# Patient Record
Sex: FEMALE | Race: WHITE | HISPANIC OR LATINO | URBAN - NONMETROPOLITAN AREA
[De-identification: names, ages, dates, MRNs, and addresses within clinical notes are randomized per-mention and may not be internally consistent; named-entity substitution may affect disease eponyms.]

---

## 2022-05-06 ENCOUNTER — OFFICE VISIT (OUTPATIENT)
Dept: URBAN - NONMETROPOLITAN AREA CLINIC 11 | Facility: CLINIC | Age: 67
End: 2022-05-06

## 2022-08-19 ENCOUNTER — OFFICE VISIT (OUTPATIENT)
Dept: URBAN - NONMETROPOLITAN AREA CLINIC 11 | Facility: CLINIC | Age: 67
End: 2022-08-19

## 2022-10-03 ENCOUNTER — OFFICE VISIT (OUTPATIENT)
Dept: URBAN - METROPOLITAN AREA CLINIC 48 | Facility: CLINIC | Age: 67
End: 2022-10-03

## 2022-10-28 ENCOUNTER — DASHBOARD ENCOUNTERS (OUTPATIENT)
Age: 67
End: 2022-10-28

## 2022-10-28 ENCOUNTER — OFFICE VISIT (OUTPATIENT)
Dept: URBAN - NONMETROPOLITAN AREA CLINIC 11 | Facility: CLINIC | Age: 67
End: 2022-10-28
Payer: MEDICARE

## 2022-10-28 ENCOUNTER — LAB OUTSIDE AN ENCOUNTER (OUTPATIENT)
Dept: URBAN - NONMETROPOLITAN AREA CLINIC 11 | Facility: CLINIC | Age: 67
End: 2022-10-28

## 2022-10-28 ENCOUNTER — OFFICE VISIT (OUTPATIENT)
Dept: URBAN - METROPOLITAN AREA TELEHEALTH 2 | Facility: TELEHEALTH | Age: 67
End: 2022-10-28

## 2022-10-28 VITALS
DIASTOLIC BLOOD PRESSURE: 65 MMHG | BODY MASS INDEX: 36.91 KG/M2 | WEIGHT: 216.2 LBS | TEMPERATURE: 97.7 F | SYSTOLIC BLOOD PRESSURE: 106 MMHG | HEART RATE: 83 BPM | HEIGHT: 64 IN

## 2022-10-28 DIAGNOSIS — K21.9 GERD: ICD-10-CM

## 2022-10-28 DIAGNOSIS — R19.4 CHANGE IN BOWEL HABITS: ICD-10-CM

## 2022-10-28 PROCEDURE — 99204 OFFICE O/P NEW MOD 45 MIN: CPT | Performed by: INTERNAL MEDICINE

## 2022-10-28 RX ORDER — IBUPROFEN, ACETAMINOPHEN 125; 250 MG/1; MG/1
2 TABLETS AS NEEDED TABLET, FILM COATED ORAL
Status: ACTIVE | COMMUNITY

## 2022-10-28 RX ORDER — FOLIC ACID 1 MG/1
TAKE 1 TABLET BY MOUTH ONCE A DAY TABLET ORAL ONCE A DAY
Refills: 0 | Status: ACTIVE | COMMUNITY

## 2022-10-28 RX ORDER — APIXABAN 5 MG/1
1 TABLET TABLET, FILM COATED ORAL TWICE A DAY
Qty: 60 TABLET | Status: ACTIVE | COMMUNITY

## 2022-10-28 RX ORDER — METOPROLOL SUCCINATE 25 MG/1
TABLET, EXTENDED RELEASE ORAL
Qty: 45 TABLET | Status: ACTIVE | COMMUNITY

## 2022-10-28 RX ORDER — HYDROCHLOROTHIAZIDE 25 MG/1
1 TABLET IN THE MORNING TABLET ORAL ONCE A DAY
Qty: 90 TABLET | Status: ACTIVE | COMMUNITY

## 2022-10-28 RX ORDER — EMPAGLIFLOZIN 10 MG/1
1 TABLET TABLET, FILM COATED ORAL ONCE A DAY
Qty: 90 TABLET | Status: ACTIVE | COMMUNITY

## 2022-10-28 RX ORDER — GUANFACINE HYDROCHLORIDE 1 MG/1
1 TABLET AT BEDTIME TABLET ORAL ONCE A DAY
Qty: 90 TABLET | Status: ACTIVE | COMMUNITY

## 2022-10-28 RX ORDER — METFORMIN ER 500 MG 500 MG/1
TABLET ORAL
Qty: 180 TABLET | Status: ACTIVE | COMMUNITY

## 2022-10-28 RX ORDER — TRAMADOL HYDROCHLORIDE 50 MG/1
TAKE 1 TO 2 TABLETS BY MOUTH THREE TIMES DAILY AS NEEDED FOR PAIN TABLET, COATED ORAL
Qty: 60 EACH | Refills: 0 | Status: ACTIVE | COMMUNITY

## 2022-10-28 RX ORDER — PANTOPRAZOLE SODIUM 40 MG/1
1 TABLET TABLET, DELAYED RELEASE ORAL ONCE A DAY
Qty: 90 TABLET | Refills: 3 | OUTPATIENT
Start: 2022-10-28

## 2022-10-28 RX ORDER — LEVOTHYROXINE SODIUM 75 UG/1
1 TABLET IN THE MORNING ON AN EMPTY STOMACH TABLET ORAL ONCE A DAY
Qty: 90 TABLET | Status: ACTIVE | COMMUNITY

## 2022-10-28 NOTE — HPI-TODAY'S VISIT:
67-year-old female is here for evaluation of reflux abdominal pain cramps and diarrhea.  The patient is noticed a change in her bowel habits over the past year.  She is now having loose stools up to 7-8 times per day.  Currently there is no associated weight loss rectal bleeding or family history of colon cancer.  The patient self medicates with Pepto-Bismol.  Her last colonoscopy she believes was 5 years ago in [place].  This is the patient's been complaining of acid reflux and bloating.  She self medicates with drinking milk.  The patient has a history of rheumatoid arthritis and does take Advil.  Currently there is no dysphagia nausea or vomiting.

## 2022-11-30 ENCOUNTER — TELEPHONE ENCOUNTER (OUTPATIENT)
Dept: URBAN - METROPOLITAN AREA CLINIC 44 | Facility: CLINIC | Age: 67
End: 2022-11-30

## 2022-12-09 ENCOUNTER — OFFICE VISIT (OUTPATIENT)
Dept: URBAN - NONMETROPOLITAN AREA MEDICAL CENTER 6 | Facility: MEDICAL CENTER | Age: 67
End: 2022-12-09

## 2022-12-27 PROBLEM — 235595009 GASTROESOPHAGEAL REFLUX DISEASE: Status: ACTIVE | Noted: 2022-10-28

## 2023-01-19 ENCOUNTER — CLAIMS CREATED FROM THE CLAIM WINDOW (OUTPATIENT)
Dept: URBAN - NONMETROPOLITAN AREA MEDICAL CENTER 6 | Facility: MEDICAL CENTER | Age: 68
End: 2023-01-19

## 2023-01-19 ENCOUNTER — CLAIMS CREATED FROM THE CLAIM WINDOW (OUTPATIENT)
Dept: URBAN - NONMETROPOLITAN AREA MEDICAL CENTER 6 | Facility: MEDICAL CENTER | Age: 68
End: 2023-01-19
Payer: MEDICARE

## 2023-01-19 DIAGNOSIS — K21.00 ALKALINE REFLUX ESOPHAGITIS: ICD-10-CM

## 2023-01-19 DIAGNOSIS — K29.60 ADENOPAPILLOMATOSIS GASTRICA: ICD-10-CM

## 2023-01-19 DIAGNOSIS — R19.4 ALTERATION IN BOWEL ELIMINATION: ICD-10-CM

## 2023-01-19 PROCEDURE — 45380 COLONOSCOPY AND BIOPSY: CPT | Performed by: INTERNAL MEDICINE

## 2023-01-19 PROCEDURE — 43239 EGD BIOPSY SINGLE/MULTIPLE: CPT | Performed by: INTERNAL MEDICINE
